# Patient Record
Sex: FEMALE | Race: BLACK OR AFRICAN AMERICAN
[De-identification: names, ages, dates, MRNs, and addresses within clinical notes are randomized per-mention and may not be internally consistent; named-entity substitution may affect disease eponyms.]

---

## 2019-04-15 ENCOUNTER — HOSPITAL ENCOUNTER (INPATIENT)
Dept: HOSPITAL 74 - JER | Age: 39
LOS: 2 days | Discharge: HOME | DRG: 194 | End: 2019-04-17
Attending: NURSE PRACTITIONER | Admitting: INTERNAL MEDICINE
Payer: COMMERCIAL

## 2019-04-15 VITALS — BODY MASS INDEX: 22.4 KG/M2

## 2019-04-15 DIAGNOSIS — E03.9: ICD-10-CM

## 2019-04-15 DIAGNOSIS — Z91.14: ICD-10-CM

## 2019-04-15 DIAGNOSIS — N18.9: ICD-10-CM

## 2019-04-15 DIAGNOSIS — I13.0: Primary | ICD-10-CM

## 2019-04-15 DIAGNOSIS — I50.41: ICD-10-CM

## 2019-04-15 DIAGNOSIS — E78.5: ICD-10-CM

## 2019-04-15 DIAGNOSIS — I31.3: ICD-10-CM

## 2019-04-15 DIAGNOSIS — I34.0: ICD-10-CM

## 2019-04-15 LAB
ALBUMIN SERPL-MCNC: 3.3 G/DL (ref 3.4–5)
ALP SERPL-CCNC: 51 U/L (ref 45–117)
ALT SERPL-CCNC: 27 U/L (ref 13–61)
ANION GAP SERPL CALC-SCNC: 7 MMOL/L (ref 8–16)
ARTERIAL BLOOD GAS PCO2: 26.8 MMHG (ref 35–45)
ARTERIAL PATENCY WRIST A: POSITIVE
AST SERPL-CCNC: 15 U/L (ref 15–37)
BASE EXCESS BLDA CALC-SCNC: -3.2 MEQ/L (ref -2–2)
BASOPHILS # BLD: 1 % (ref 0–2)
BILIRUB SERPL-MCNC: 0.3 MG/DL (ref 0.2–1)
BNP SERPL-MCNC: 1655.3 PG/ML (ref 5–125)
BUN SERPL-MCNC: 15 MG/DL (ref 7–18)
CALCIUM SERPL-MCNC: 8.3 MG/DL (ref 8.5–10.1)
CHLORIDE SERPL-SCNC: 111 MMOL/L (ref 98–107)
CO2 SERPL-SCNC: 23 MMOL/L (ref 21–32)
COHGB MFR BLD: 0.8 % (ref 0–2)
CREAT SERPL-MCNC: 1.2 MG/DL (ref 0.55–1.3)
DEPRECATED RDW RBC AUTO: 13.8 % (ref 11.6–15.6)
EOSINOPHIL # BLD: 2 % (ref 0–4.5)
GLUCOSE SERPL-MCNC: 92 MG/DL (ref 74–106)
HCT VFR BLD CALC: 36 % (ref 32.4–45.2)
HGB BLD-MCNC: 12.2 GM/DL (ref 10.7–15.3)
INR BLD: 1.05 (ref 0.83–1.09)
LYMPHOCYTES # BLD: 20.7 % (ref 8–40)
MCH RBC QN AUTO: 30.6 PG (ref 25.7–33.7)
MCHC RBC AUTO-ENTMCNC: 33.8 G/DL (ref 32–36)
MCV RBC: 90.6 FL (ref 80–96)
MONOCYTES # BLD AUTO: 3.4 % (ref 3.8–10.2)
NEUTROPHILS # BLD: 72.9 % (ref 42.8–82.8)
PLATELET # BLD AUTO: 246 K/MM3 (ref 134–434)
PMV BLD: 9.6 FL (ref 7.5–11.1)
PO2 BLDA: 129 MMHG (ref 80–105)
POTASSIUM SERPLBLD-SCNC: 4.2 MMOL/L (ref 3.5–5.1)
PROT SERPL-MCNC: 6.7 G/DL (ref 6.4–8.2)
PT PNL PPP: 12.4 SEC (ref 9.7–13)
RBC # BLD AUTO: 3.98 M/MM3 (ref 3.6–5.2)
SAO2 % BLDA: 98.8 % (ref 95–98)
SODIUM SERPL-SCNC: 141 MMOL/L (ref 136–145)
WBC # BLD AUTO: 9.5 K/MM3 (ref 4–10)

## 2019-04-15 RX ADMIN — HEPARIN SODIUM SCH UNIT: 5000 INJECTION, SOLUTION INTRAVENOUS; SUBCUTANEOUS at 23:53

## 2019-04-15 NOTE — PN
Teaching Attending Note


Name of Resident: Pamela Reeves





ATTENDING PHYSICIAN STATEMENT





I saw and evaluated the patient.


I reviewed the resident's note and discussed the case with the resident.


I agree with the resident's findings and plan as documented.








SUBJECTIVE: 38 yrs old F PMH of Hypothyroidism not taking levothyroxine since 

Dec 2018 due to insurance issue , works at a car [employer], no other past 

medical history, unlimited ET  few months ago, , no high risk behaviour last 

HIV testing was 1 yr ago, no H/O Joints pain, myelgias, recent URTI, fever, wt 

loss, chronic cough , today present with 2-3 wks h/o gradually worsening SOB, 

decrease ET, ABEBE, PND and orthopnea, initially she though that she is getting 

anxiety attacks  for past 2 days patient developed extreme SOB , couldn't walk 

few steps  dry cough and palpitation, came to Ed for evaluation as er Ed, rapid 

assessment showed collapsible IVC patient recived IV NS 1 Lt developed sever 

shorness of breath  D dimmers +, CXR diffuse  interstitial infiltrates, CTA no 

PE again shows Pulmonary congestion,  








OBJECTIVE:


 Vital Signs











Temperature  97.8 F   04/15/19 12:15


 


Pulse Rate  105 H  04/15/19 17:35


 


Respiratory Rate  20   04/15/19 17:35


 


Blood Pressure  147/115 H  04/15/19 17:35


 


O2 Sat by Pulse Oximetry (%)  97   04/15/19 17:55











young F on BIPAP c/o SOB  and chest tightness





HEENT: Mm moist, JVD+





NECK: + JVD no Bruit, carotids +





CHEST: B/L diffuse crepts





CVSL s1S2 Tachycardia





ABD: non tender BS





EXT: No edema feet, no calf tenderness





CNS: AOX3 





LABS:


CBC,CMP











WBC  9.5 K/mm3 (4.0-10.0)   04/15/19  13:00    


 


RBC  3.98 M/mm3 (3.60-5.2)   04/15/19  13:00    


 


Hgb  12.2 GM/dL (10.7-15.3)   04/15/19  13:00    


 


Hct  36.0 % (32.4-45.2)   04/15/19  13:00    


 


MCV  90.6 fl (80-96)   04/15/19  13:00    


 


MCH  30.6 pg (25.7-33.7)  D 04/15/19  13:00    


 


MCHC  33.8 g/dl (32.0-36.0)   04/15/19  13:00    


 


RDW  13.8 % (11.6-15.6)   04/15/19  13:00    


 


Plt Count  246 K/MM3 (134-434)  D 04/15/19  13:00    


 


MPV  9.6 fl (7.5-11.1)   04/15/19  13:00    


 


Absolute Neuts (auto)  6.9 K/mm3 (1.5-8.0)   04/15/19  13:00    


 


Neutrophils %  72.9 % (42.8-82.8)  D 04/15/19  13:00    


 


Lymphocytes %  20.7 % (8-40)  D 04/15/19  13:00    


 


Monocytes %  3.4 % (3.8-10.2)  L  04/15/19  13:00    


 


Eosinophils %  2.0 % (0-4.5)   04/15/19  13:00    


 


Basophils %  1.0 % (0-2.0)  D 04/15/19  13:00    


 


Nucleated RBC %  0 % (0-0)   04/15/19  13:00    


 


Sodium  141 mmol/L (136-145)   04/15/19  13:35    


 


Potassium  4.2 mmol/L (3.5-5.1)   04/15/19  13:35    


 


Chloride  111 mmol/L ()  H  04/15/19  13:35    


 


Carbon Dioxide  23 mmol/L (21-32)   04/15/19  13:35    


 


Anion Gap  7 MMOL/L (8-16)  L  04/15/19  13:35    


 


BUN  15 mg/dL (7-18)   04/15/19  13:35    


 


Creatinine  1.2 mg/dL (0.55-1.3)   04/15/19  13:35    


 


Creat Clearance w eGFR  50.83  (>60)   04/15/19  13:35    


 


Random Glucose  92 mg/dL ()   04/15/19  13:35    


 


Calcium  8.3 mg/dL (8.5-10.1)  L  04/15/19  13:35    


 


Total Bilirubin  0.3 mg/dL (0.2-1)   04/15/19  13:35    


 


AST  15 U/L (15-37)   04/15/19  13:35    


 


ALT  27 U/L (13-61)   04/15/19  13:35    


 


Alkaline Phosphatase  51 U/L ()   04/15/19  13:35    


 


Creatine Kinase  110 U/L ()   04/15/19  13:35    


 


Troponin I  < 0.02 ng/ml (0.00-0.05)   04/15/19  13:35    


 


B-Natriuretic Peptide  1655.3 pg/ml (5-125)  H  04/15/19  13:35    


 


Total Protein  6.7 g/dl (6.4-8.2)   04/15/19  13:35    


 


Albumin  3.3 g/dl (3.4-5.0)  L  04/15/19  13:35    


 


Serum Pregnancy, Qual  Negative   04/15/19  13:00    








CXR: B/l  interstial marking with nodular infiltrates





CTA: No central PE





D dimmers +





BNP 1655





EKG:  sinus tachycardia poor progression of R wave V1-V3








ASSESSMENT AND PLAN:38 yrs old F PMH of Hypothyroidism not taking levothyroxine 

since Dec 2018 due to insurance issue , works at a car [employer], no other 

past medical history, unlimited ET  few months ago,present with acute 

respiratory failure that decompensted  after IV Hydration, elevated D dimmers, 

CXR diffuse nodular infiltrates, CTA -ve for PE but again infiltrates, 

developed blood tinged sputum , elevated BNP, patient is on BIPAP respiratory 

status improved


Acute Dempensated CHF/respiratory failure;





At present unclear etiology,  considering  no fever, sick contact or recent 

travelling but gradual decompensation over few wks  possibility of either 

Pulmonary etiology infectious/non infectious  F/U Blood culture, ProCalcitonin, 

LDH , HIV testing if LDH is very high, sputum PCP,  consider ID consultation 

pending HIV result, for cardiac etiology ECHO, Cardiology consult, patient 

recived IV Lasix monitor on BIPAP  F/U ABG CBC, CMP, ESR, CRP, AEC level., 

Pulmonary consult.





Case discussed with the resident

## 2019-04-15 NOTE — PDOC
Attending Attestation





- Resident


Resident Name: Nelly Glover





- ED Attending Attestation


I have performed the following: I have examined & evaluated the patient, The 

case was reviewed & discussed with the resident, I agree w/resident's findings 

& plan, Exceptions are as noted





- HPI


HPI: 





04/15/19 15:09


38 F with h/o hyperthyroidism presenting to ED with SOB and chest pain x 2 

weeks. Pt reports ABEBE, as well as palpitations. Pt also complains of being 

woken from sleep with chest pressure. Pt denies any leg swelling. Denies h/o DVT

/PE. Denies OCP use. No recent travel/immobilization. Pt was seen by PMD 4/8 

and was instructed to come to ED, but pt waited until today because she lost 

her insurance.





- Physicial Exam


PE: 





04/15/19 15:24


"GENERAL: Awake, alert, and fully oriented, in no acute distress.


HEAD: No signs of trauma


EYES: PERRLA, EOMI, sclera anicteric, conjunctiva clear


ENT: Auricles normal inspection, hearing grossly normal, nares patent, 

oropharynx clear without exudates. Moist mucosa


NECK: Nontender, no stepoffs, Normal ROM, supple, no lymphadenopathy, JVD, or 

masses


LUNGS: Breath sounds equal, clear to auscultation bilaterally.  No wheezes, and 

no crackles


HEART: Regular rate and rhythm, normal S1 and S2, no murmurs, rubs or gallops


ABDOMEN: Soft, nontender, normoactive bowel sounds.  No guarding, no rebound.  

No masses


EXTREMITIES: Normal range of motion, no edema.  No clubbing or cyanosis. No 

cords, erythema, or tenderness


NEUROLOGICAL: Cranial nerves II through XII intact. 5/5 strength and sensation 

in all extremities, Normal speech, normal gait, normal cerebellar function


SKIN: Warm, Dry, normal turgor, no rashes or lesions noted.





- Critical Care Time


Total Critical Care Time: 60


Critical Care Statement: The care of this patient involved high complexity 

decision making to prevent further life threatening deterioration of the patient

's condition and/or to evaluate & treat vital organ system(s) failure or risk 

of failure.





- Medical Decision Making





04/15/19 15:24


38 F with SOB and chest pain. Vitals notable for tachycardia. WIll need to r/o 

PE. Also consider ACS/CHF.


- Labs, trop, BNP, ddimer


- CXR





04/15/19 15:25


CXR clear





Bedside US reveals small pericardial effusion and pleural effusions + B-lines. 

Abdominal US shows small amount of pelvic free fluid.


Bedside DVT studies negative bilaterally





Ddimer and BNP elevated


Will obtain CTA chest to r/o PE


Also CT abdomen/pelvis w/ IV contrast to evaluate for malignancy given ascites 

and pleural effusions seen on bedside sono





04/15/19 17:29


CTA negative for PE


CT shows likely pulmonary congestion





IV fluids DC'ed, lasix 40mg IV given





Pt reassessed - now with worsening SOB + rales on exam


Will place pt on BiPAP, give sublingual nitro





04/15/19 18:29


Discussed case with ICU team, who has evaluated pt


Signout given to Dr. South, who is at bedside to evaluate pt 01-Nov-2018 07:44:28

## 2019-04-15 NOTE — CONSULT
Consultation: 


REQUESTING PROVIDER: Dr. Martini





CONSULT REQUEST: We have been asked to medically evaluate this patient for SOB.





HISTORY OF PRESENT ILLNESS:


Patient is a 37 y/o female with a history of thyroid disease who presents for 

shortness of breath. Patient has been been feeling short of breath with 

pressure in her chest for the last two weeks. She reports the symptoms have 

been worsening. She is unable to tolerate walking over 20 feet without getting 

short of breath. she has been sleeping with more pillows at night. She reports 

having low apetite and diarrhea with eating. She decided to coming into the 

Emergency department tonight. They were ruling out PE and hydrated with fluids. 

Patient had continued difficulty breathing with worsening lung sounds so she 

was placed on bipap. She was given 80 of lasix and nitropaste. She reports her 

breathing is feeling a little better but she still has a pressure in her chest. 

She also feels like she has pain in the back of her chest which is causing 

difficulty breathing. Patients boyfriend has been ill with viral like symptoms, 

but she denies having any. Yesterday she had a cough with specks of blood, but 

denies fever, chills, rhinorrhea, or congestion. Patient has no other 

complaints. 








REVIEW OF SYSTEMS:


CONSTITUTIONAL: 


Absent:  fever, chills, diaphoresis, generalized weakness, malaise, loss of 

appetite, weight change


HEENT: 


Absent:  rhinorrhea, nasal congestion, throat pain, throat swelling, difficulty 

swallowing, mouth swelling, ear pain, eye pain, visual changes


CARDIOVASCULAR: pressure


Absent: chest pain, syncope, palpitations, irregular heart rate, lightheadedness

, peripheral edema


RESPIRATORY: shortness of breath


Absent: cough, dyspnea with exertion, orthopnea, wheezing, stridor, hemoptysis


GASTROINTESTINAL:


Absent: abdominal pain, abdominal distension, nausea, vomiting, diarrhea, 

constipation, melena, hematochezia


GENITOURINARY: 


Absent: dysuria, frequency, urgency, hesitancy, hematuria, flank pain, genital 

pain


MUSCULOSKELETAL: 


Absent: myalgia, arthralgia, joint swelling, back pain, neck pain


SKIN: 


Absent: rash, itching, pallor


HEMATOLOGIC/IMMUNOLOGIC: 


Absent: easy bleeding, easy bruising, lymphadenopathy, frequent infections


ENDOCRINE:


Absent: unexplained weight gain, unexplained weight loss, heat intolerance, 

cold intolerance


NEUROLOGIC: 


Absent: headache, focal weakness or paresthesias, dizziness, unsteady gait, 

seizure, mental status changes, bladder or bowel incontinence


PSYCHIATRIC: 


Absent: anxiety, depression, suicidal or homicidal ideation, hallucinations.





PHYSICAL EXAMINATION





  Vital Signs











Temperature  97.8 F   04/15/19 12:15


 


Pulse Rate  105 H  04/15/19 17:35


 


Respiratory Rate  20   04/15/19 17:35


 


Blood Pressure  147/115 H  04/15/19 17:35


 


O2 Sat by Pulse Oximetry (%)  100   04/15/19 14:30


























GENERAL: Awake, alert, and fully oriented, on bipap


HEAD: Normal with no signs of trauma.


EYES: Pupils equal, round and reactive to light, extraocular movements intact,


EARS, NOSE, THROAT: Moist mucous membranes.


NECK: no JVD


LUNGS: Breath sounds equal, crackles at bilateral bases, no accessory muscle use


HEART: Regular rate and rhythm, normal S1 and S2 without murmur, rub or gallop.


ABDOMEN: Soft, nontender, not distended, normoactive bowel sounds, 


MUSCULOSKELETAL: Normal range of motion at all joints. 


LOWER EXTREMITIES: 2+ pulses, warm, well-perfused. No calf tenderness. No 

peripheral edema. 


PSYCHIATRIC: Cooperative. Good eye contact. Appropriate mood and affect.


SKIN: Warm, dry, normal turgor, no rashes or lesions noted. 











  CBCD











WBC  9.5 K/mm3 (4.0-10.0)   04/15/19  13:00    


 


RBC  3.98 M/mm3 (3.60-5.2)   04/15/19  13:00    


 


Hgb  12.2 GM/dL (10.7-15.3)   04/15/19  13:00    


 


Hct  36.0 % (32.4-45.2)   04/15/19  13:00    


 


MCV  90.6 fl (80-96)   04/15/19  13:00    


 


MCHC  33.8 g/dl (32.0-36.0)   04/15/19  13:00    


 


RDW  13.8 % (11.6-15.6)   04/15/19  13:00    


 


Plt Count  246 K/MM3 (134-434)  D 04/15/19  13:00    


 


MPV  9.6 fl (7.5-11.1)   04/15/19  13:00    








 CMP











Sodium  141 mmol/L (136-145)   04/15/19  13:35    


 


Potassium  4.2 mmol/L (3.5-5.1)   04/15/19  13:35    


 


Chloride  111 mmol/L ()  H  04/15/19  13:35    


 


Carbon Dioxide  23 mmol/L (21-32)   04/15/19  13:35    


 


Anion Gap  7 MMOL/L (8-16)  L  04/15/19  13:35    


 


BUN  15 mg/dL (7-18)   04/15/19  13:35    


 


Creatinine  1.2 mg/dL (0.55-1.3)   04/15/19  13:35    


 


Creat Clearance w eGFR  50.83  (>60)   04/15/19  13:35    


 


Calcium  8.3 mg/dL (8.5-10.1)  L  04/15/19  13:35    


 


Total Bilirubin  0.3 mg/dL (0.2-1)   04/15/19  13:35    


 


AST  15 U/L (15-37)   04/15/19  13:35    


 


ALT  27 U/L (13-61)   04/15/19  13:35    


 


Alkaline Phosphatase  51 U/L ()   04/15/19  13:35    


 


Total Protein  6.7 g/dl (6.4-8.2)   04/15/19  13:35    


 


Albumin  3.3 g/dl (3.4-5.0)  L  04/15/19  13:35    

















ASSESSMENT/PLAN:





Patient is a 37 y/o female with a history of hypothyroidism who is admitted to 

the ICU for respiratory decompensation likely due to new CHF.








Neuro


   - A& O x3


   - monitor for any change in mental status


   - afebrile





Cardio


   - likely new onset CHF, BNP 1655


   - received 1 L NS


   - lasix 80 given with 200 ml output


   - nitropaste given for pressure


   - troponin negative


   - ekg without any st changes, twave abnormalities, or av blocks noted


   - f/u Dr. Garza consult


   - f/u Echo





Pulm


   - CXR: clear, CTA: interstitial vascular pulmonary congestion, mild 

cardiomegaly, small bilateral pleural effusions, left upper lobe and left lower 

lobe with opacity, no evidence of PE


   - ddimer 533, no PE on CTA


   - saturation 100% on Bipap, Ipap 10, Epap 5, O2% 40, titrate down as possible


   - f/u ABG


   - keep O 2 above 95%


   - f/u morning CXR





GI


   - stable





Renal


   - Cr: 1.2, monitor with lasix use





Endo


   - continue levothyroxine


   - f/u TSH, free T 3, free T4


   - unlikely myxedema coma without other evidence of hypothyroidism





FEN


   - NPO with Bipap


   - hold fluids, monitor I's and O's


   - no electrolyte abnormalitites





Dispo: We will continue to follow the patient. Thank you for this consultative 

opportunity.


Monitor overnight and titrate off BIPAP











Visit type





- Emergency Visit


Emergency Visit: Yes


ED Registration Date: 04/15/19


Care time: The patient presented to the Emergency Department on the above date 

and was hospitalized for further evaluation of their emergent condition.





- New Patient


This patient is new to me today: Yes


Date on this admission: 04/16/19





- Critical Care


Critical Care patient: Yes


Total Critical Care Time (in minutes): 40


Critical Care Statement: The care of this patient involved high complexity 

decision making to prevent further life threatening deterioration of the patient

's condition and/or to evaluate & treat vital organ system(s) failure or risk 

of failure.
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
## 2019-04-15 NOTE — HP
CHIEF COMPLAINT:





PCP: Dr Day





HISTORY OF PRESENT ILLNESS:


This is a 39 yo F, with PMH of hypothyroidism, who present due to worsening 

exertional SOB, palpitations and chest pressure x 2 w. patient was directed to 

ED by her PCP after a visit on 4/08 for DVT/PE/effusion work-up due to noted 

tacycardia and diminished breath sounds in R lung base. Patient has not taken 

her levothyroxine for several months due to loss of insurance and reports new 

bouts on anxiety and heat intolerance. Patient does not take any medication but 

did use nuvaring birth control until jan 2019. Denies family history of CHF, 

sudden death. denies recent viral illness, drug use, alcohol abuse, syncope, 

cough, orthopnea, le edema, f/c, abd pain,n/v/d/c, dysuria. 





CTA no PE but shows diffuse pulm edema suspicious for ARDS. Placed on BIPAP in 

ED 





ER course was notable for:


(1) cxr, cta, ct abd/pelvis 


(2) labs 


(3) 1L NS, 80 lasix, ntg 





Recent Travel: denies 





PAST MEDICAL HISTORY: as above 





PAST SURGICAL HISTORY: R breast cyst excision ~20 years ago





Social History:


Smoking: denies 


Alcohol:denies 


Drugs: denies 





Family History: father and grandfather with early CAD and prostate CA (in 40s); 

HTN history all through mother's family.





Allergies





iron Allergy (Verified 12/08/14 12:55)


 








HOME MEDICATIONS:


 Home Medications











 Medication  Instructions  Recorded


 


NK [No Known Home Medication]  12/08/14








REVIEW OF SYSTEMS


CONSTITUTIONAL: 


Absent:  fever, chills


HEENT: 


Absent:  rhinorrhea, nasal congestion, throat pain


CARDIOVASCULAR: 


Absent: syncope, irregular heart rate, lightheadedness, peripheral edema


RESPIRATORY: 


Absent: cough, orthopnea, wheezing, stridor, hemoptysis


GASTROINTESTINAL:


Absent: abdominal pain, abdominal distension, nausea, vomiting, diarrhea, 

constipation, melena, hematochezia


GENITOURINARY: 


Absent: dysuria


MUSCULOSKELETAL: 


Absent: myalgia, arthralgia


SKIN: 


Absent: rash, itching, pallor


HEMATOLOGIC/IMMUNOLOGIC: 


Absent: easy bleeding, easy bruising


ENDOCRINE:


Absent: unexplained weight gain, unexplained weight loss


NEUROLOGIC: 


Absent: headache, focal weakness or paresthesias


PSYCHIATRIC: 


Absent: anxiety, depression








PHYSICAL EXAMINATION


 Vital Signs - 24 hr











  04/15/19 04/15/19 04/15/19





  12:15 12:30 14:30


 


Temperature 97.8 F  


 


Pulse Rate 114 H  


 


Pulse Rate [   90





Apical]   


 


Respiratory 19  20





Rate   


 


Blood Pressure 143/108 H  


 


Blood Pressure   147/113 H





[Right Arm]   


 


O2 Sat by Pulse 99 99 100





Oximetry (%)   











GENERAL: Awake, alert, and fully oriented, in no acute distress.


HEAD: Normal with no signs of trauma.


EYES: Pupils equal, round and reactive to light, extraocular movements intact, 

sclera anicteric, conjunctiva clear. 


EARS, NOSE, THROAT: Moist mucous membranes.


NECK: supple without JVD


LUNGS: diffuse rales and crackles, copious clear blood tinges sputum 


HEART: tachy rate and regular rhythm, normal S1 and S2 without murmur


ABDOMEN: Soft, nontender, not distended, normoactive bowel sounds, no guarding, 

no rebound, no masses. 


MUSCULOSKELETAL:No CVA tenderness.


UPPER EXTREMITIES: 2+ pulses, warm, well-perfused. No cyanosis. No clubbing. No 

peripheral edema.


LOWER EXTREMITIES: 2+ pulses, warm, well-perfused. No calf tenderness. No 

peripheral edema. 


NEUROLOGICAL:  Cranial nerves II-XII grossly intact. Normal speech. 


PSYCHIATRIC: Cooperative. Good eye contact. Appropriate mood and affect.


SKIN: Warm, dry


 Laboratory Results - last 24 hr











  04/15/19 04/15/19 04/15/19





  13:00 13:00 13:00


 


WBC  9.5  


 


RBC  3.98  


 


Hgb  12.2  


 


Hct  36.0  


 


MCV  90.6  


 


MCH  30.6  D  


 


MCHC  33.8  


 


RDW  13.8  


 


Plt Count  246  D  


 


MPV  9.6  


 


Absolute Neuts (auto)  6.9  


 


Neutrophils %  72.9  D  


 


Lymphocytes %  20.7  D  


 


Monocytes %  3.4 L  


 


Eosinophils %  2.0  


 


Basophils %  1.0  D  


 


Nucleated RBC %  0  


 


PT with INR   12.40 


 


INR   1.05 


 


D-Dimer   


 


Sodium   


 


Potassium   


 


Chloride   


 


Carbon Dioxide   


 


Anion Gap   


 


BUN   


 


Creatinine   


 


Creat Clearance w eGFR   


 


Random Glucose   


 


Calcium   


 


Total Bilirubin   


 


AST   


 


ALT   


 


Alkaline Phosphatase   


 


Creatine Kinase   


 


Troponin I   


 


B-Natriuretic Peptide   


 


Total Protein   


 


Albumin   


 


Serum Pregnancy, Qual    Negative


 


Blood Type   


 


Antibody Screen   














  04/15/19 04/15/19 04/15/19





  13:00 13:00 13:35


 


WBC   


 


RBC   


 


Hgb   


 


Hct   


 


MCV   


 


MCH   


 


MCHC   


 


RDW   


 


Plt Count   


 


MPV   


 


Absolute Neuts (auto)   


 


Neutrophils %   


 


Lymphocytes %   


 


Monocytes %   


 


Eosinophils %   


 


Basophils %   


 


Nucleated RBC %   


 


PT with INR   


 


INR   


 


D-Dimer   533 H 


 


Sodium    141


 


Potassium    4.2


 


Chloride    111 H


 


Carbon Dioxide    23


 


Anion Gap    7 L


 


BUN    15


 


Creatinine    1.2


 


Creat Clearance w eGFR    50.83


 


Random Glucose    92


 


Calcium    8.3 L


 


Total Bilirubin    0.3


 


AST    15


 


ALT    27


 


Alkaline Phosphatase    51


 


Creatine Kinase    110


 


Troponin I    < 0.02


 


B-Natriuretic Peptide    1655.3 H


 


Total Protein    6.7


 


Albumin    3.3 L


 


Serum Pregnancy, Qual   


 


Blood Type  O POSITIVE  


 


Antibody Screen  Negative  











ASSESSMENT/PLAN:





This is a 39 yo F, with PMH of hypothyroidism, who present due to worsening 

exertional SOB, palpitations and chest pressure x 2 w.





Acute respiratory distress


Pulmonary edema


Fluid overload 


Hypothyroidism 


-cardiac vs primary pulmonary cause 


-differential includes hypothyroid induced chf, cardiomyopaty, valvular issues, 

sarcoid, pcp, pulm fibrosis


-s/p lasix 80, continue daily lasix 40. stric I and O daily weight


-TTE


-cardiac monitoring 


-f/u mag/phos, LDH, procalcitonin, TFT's, abg


-PIPAP


-cardio, pulm consults 


-Loenox ppx 


-ICU care 











Problem List





- Problem


(1) Pulmonary edema cardiac cause


Code(s): I50.1 - LEFT VENTRICULAR FAILURE, UNSPECIFIED   





(2) Heart failure


Code(s): I50.9 - HEART FAILURE, UNSPECIFIED   





(3) Hypothyroid


Code(s): E03.9 - HYPOTHYROIDISM, UNSPECIFIED   





Visit type





- Emergency Visit


Emergency Visit: Yes


ED Registration Date: 04/15/19


Care time: The patient presented to the Emergency Department on the above date 

and was hospitalized for further evaluation of their emergent condition.





- New Patient


This patient is new to me today: Yes


Date on this admission: 04/15/19





- Critical Care


Critical Care patient: Yes


Total Critical Care Time (in minutes): 40


Critical Care Statement: The care of this patient involved high complexity 

decision making to prevent further life threatening deterioration of the patient

's condition and/or to evaluate & treat vital organ system(s) failure or risk 

of failure.

## 2019-04-16 LAB
ALBUMIN SERPL-MCNC: 3.3 G/DL (ref 3.4–5)
ALP SERPL-CCNC: 53 U/L (ref 45–117)
ALT SERPL-CCNC: 23 U/L (ref 13–61)
AMPHET UR-MCNC: NEGATIVE NG/ML
ANION GAP SERPL CALC-SCNC: 8 MMOL/L (ref 8–16)
APPEARANCE UR: CLEAR
AST SERPL-CCNC: 15 U/L (ref 15–37)
BARBITURATES UR-MCNC: NEGATIVE NG/ML
BASOPHILS # BLD: 1 % (ref 0–2)
BENZODIAZ UR SCN-MCNC: NEGATIVE NG/ML
BILIRUB DIRECT SERPL-MCNC: 185 U/L (ref 84–246)
BILIRUB SERPL-MCNC: 0.7 MG/DL (ref 0.2–1)
BILIRUB UR STRIP.AUTO-MCNC: NEGATIVE MG/DL
BUN SERPL-MCNC: 13 MG/DL (ref 7–18)
CALCIUM SERPL-MCNC: 8.3 MG/DL (ref 8.5–10.1)
CHLORIDE SERPL-SCNC: 108 MMOL/L (ref 98–107)
CHOLEST SERPL-MCNC: 211 MG/DL (ref 50–200)
CO2 SERPL-SCNC: 23 MMOL/L (ref 21–32)
COCAINE UR-MCNC: NEGATIVE NG/ML
COLOR UR: YELLOW
CREAT SERPL-MCNC: 1 MG/DL (ref 0.55–1.3)
DEPRECATED RDW RBC AUTO: 13.8 % (ref 11.6–15.6)
EOSINOPHIL # BLD: 0.8 % (ref 0–4.5)
ERYTHROCYTE [SEDIMENTATION RATE] IN BLOOD BY WESTERGREN METHOD: 8 MM/HR (ref 0–20)
GLUCOSE SERPL-MCNC: 73 MG/DL (ref 74–106)
HCT VFR BLD CALC: 35 % (ref 32.4–45.2)
HDLC SERPL-MCNC: 72 MG/DL (ref 40–60)
HGB BLD-MCNC: 12 GM/DL (ref 10.7–15.3)
KETONES UR QL STRIP: (no result)
LEUKOCYTE ESTERASE UR QL STRIP.AUTO: NEGATIVE
LYMPHOCYTES # BLD: 12.9 % (ref 8–40)
MAGNESIUM SERPL-MCNC: 2 MG/DL (ref 1.8–2.4)
MCH RBC QN AUTO: 30.9 PG (ref 25.7–33.7)
MCHC RBC AUTO-ENTMCNC: 34.2 G/DL (ref 32–36)
MCV RBC: 90.3 FL (ref 80–96)
METHADONE UR-MCNC: NEGATIVE NG/ML
MONOCYTES # BLD AUTO: 1.9 % (ref 3.8–10.2)
NEUTROPHILS # BLD: 83.4 % (ref 42.8–82.8)
NITRITE UR QL STRIP: NEGATIVE
OPIATES UR QL SCN: NEGATIVE NG/ML
PCP UR QL SCN: NEGATIVE NG/ML
PH UR: 5 [PH] (ref 5–8)
PHOSPHATE SERPL-MCNC: 3.3 MG/DL (ref 2.5–4.9)
PLATELET # BLD AUTO: 220 K/MM3 (ref 134–434)
PMV BLD: 10.2 FL (ref 7.5–11.1)
POTASSIUM SERPLBLD-SCNC: 3.6 MMOL/L (ref 3.5–5.1)
PROT SERPL-MCNC: 6.7 G/DL (ref 6.4–8.2)
PROT UR QL STRIP: NEGATIVE
PROT UR QL STRIP: NEGATIVE
RBC # BLD AUTO: 3.87 M/MM3 (ref 3.6–5.2)
SODIUM SERPL-SCNC: 139 MMOL/L (ref 136–145)
SP GR UR: 1.02 (ref 1.01–1.03)
TRIGL SERPL-MCNC: 162 MG/DL (ref 0–150)
UROBILINOGEN UR STRIP-MCNC: 0.2 MG/DL (ref 0.2–1)
WBC # BLD AUTO: 11.3 K/MM3 (ref 4–10)

## 2019-04-16 RX ADMIN — HEPARIN SODIUM SCH UNIT: 5000 INJECTION, SOLUTION INTRAVENOUS; SUBCUTANEOUS at 12:59

## 2019-04-16 RX ADMIN — ACETAMINOPHEN PRN MG: 325 TABLET ORAL at 19:57

## 2019-04-16 RX ADMIN — ACETAMINOPHEN PRN MG: 325 TABLET ORAL at 10:09

## 2019-04-16 RX ADMIN — CARVEDILOL SCH MG: 6.25 TABLET, FILM COATED ORAL at 21:10

## 2019-04-16 RX ADMIN — ACETAMINOPHEN PRN MG: 325 TABLET ORAL at 05:32

## 2019-04-16 RX ADMIN — MUPIROCIN SCH APPLIC: 20 OINTMENT TOPICAL at 10:10

## 2019-04-16 RX ADMIN — HEPARIN SODIUM SCH UNIT: 5000 INJECTION, SOLUTION INTRAVENOUS; SUBCUTANEOUS at 21:11

## 2019-04-16 RX ADMIN — MUPIROCIN SCH APPLIC: 20 OINTMENT TOPICAL at 00:36

## 2019-04-16 RX ADMIN — ACETAMINOPHEN PRN MG: 325 TABLET ORAL at 00:45

## 2019-04-16 RX ADMIN — HEPARIN SODIUM SCH UNIT: 5000 INJECTION, SOLUTION INTRAVENOUS; SUBCUTANEOUS at 05:32

## 2019-04-16 NOTE — EKG
Test Reason : 

Blood Pressure : ***/*** mmHG

Vent. Rate : 096 BPM     Atrial Rate : 096 BPM

   P-R Int : 162 ms          QRS Dur : 082 ms

    QT Int : 368 ms       P-R-T Axes : 077 -19 053 degrees

   QTc Int : 464 ms

 

NORMAL SINUS RHYTHM

POSSIBLE LEFT ATRIAL ENLARGEMENT

POSSIBLE ANTERIOR INFARCT , AGE UNDETERMINED

ABNORMAL ECG

NO PREVIOUS ECGS AVAILABLE

Confirmed by MD Ton, Checo ([id number]) on 4/16/2019 11:00:12 AM

 

Referred By:             Confirmed By:Checo Camilo MD

## 2019-04-16 NOTE — PN
Physical Exam: 


SUBJECTIVE: Patient seen this morning and reports her symptoms are feeling 

better. She was taken off Bipap overnight and had no acute events. 








OBJECTIVE:





  Vital Signs











Temperature  98.1 F   04/16/19 08:00


 


Pulse Rate  93 H  04/16/19 10:00


 


Respiratory Rate  20   04/16/19 10:00


 


Blood Pressure  125/90   04/16/19 10:00


 


O2 Sat by Pulse Oximetry (%)  100   04/16/19 09:24




















GENERAL: Awake, alert, and fully oriented


EYES: Pupils equal, round and reactive to light, extraocular movements intact,


EARS, NOSE, THROAT: Moist mucous membranes.


NECK: no JVD


LUNGS: Breath sounds equal, no rales, rhonci or crackles


HEART: Regular rate and rhythm, normal S1 and S2 without murmur, rub or gallop.


ABDOMEN: Soft, nontender, not distended, normoactive bowel sounds, 


MUSCULOSKELETAL: Normal range of motion at all joints. 


LOWER EXTREMITIES: 2+ pulses, warm, well-perfused. No calf tenderness. No 

peripheral edema. 


PSYCHIATRIC: Cooperative. Good eye contact. Appropriate mood and affect.


SKIN: Warm, dry, normal turgor, no rashes or lesions noted. 

















  CBCD











WBC  11.3 K/mm3 (4.0-10.0)  H  04/16/19  05:30    


 


RBC  3.87 M/mm3 (3.60-5.2)   04/16/19  05:30    


 


Hgb  12.0 GM/dL (10.7-15.3)   04/16/19  05:30    


 


Hct  35.0 % (32.4-45.2)   04/16/19  05:30    


 


MCV  90.3 fl (80-96)   04/16/19  05:30    


 


MCHC  34.2 g/dl (32.0-36.0)   04/16/19  05:30    


 


RDW  13.8 % (11.6-15.6)   04/16/19  05:30    


 


Plt Count  220 K/MM3 (134-434)   04/16/19  05:30    


 


MPV  10.2 fl (7.5-11.1)   04/16/19  05:30    








 CMP











Sodium  139 mmol/L (136-145)   04/16/19  05:30    


 


Potassium  3.6 mmol/L (3.5-5.1)   04/16/19  05:30    


 


Chloride  108 mmol/L ()  H  04/16/19  05:30    


 


Carbon Dioxide  23 mmol/L (21-32)   04/16/19  05:30    


 


Anion Gap  8 MMOL/L (8-16)   04/16/19  05:30    


 


BUN  13 mg/dL (7-18)   04/16/19  05:30    


 


Creatinine  1.0 mg/dL (0.55-1.3)   04/16/19  05:30    


 


Creat Clearance w eGFR  62.74  (>60)   04/16/19  05:30    


 


Calcium  8.3 mg/dL (8.5-10.1)  L  04/16/19  05:30    


 


Total Bilirubin  0.7 mg/dL (0.2-1)   04/16/19  05:30    


 


AST  15 U/L (15-37)   04/16/19  05:30    


 


ALT  23 U/L (13-61)   04/16/19  05:30    


 


Alkaline Phosphatase  53 U/L ()   04/16/19  05:30    


 


Total Protein  6.7 g/dl (6.4-8.2)   04/16/19  05:30    


 


Albumin  3.3 g/dl (3.4-5.0)  L  04/16/19  05:30    


























Active Medications





Acetaminophen (Tylenol -)  650 mg PO Q4H PRN


   PRN Reason: HEADACHE


   Last Admin: 04/16/19 10:09 Dose:  650 mg


Aspirin (Ecotrin -)  81 mg PO DAILY [facility]


   Last Admin: 04/16/19 10:11 Dose:  81 mg


Carvedilol (Coreg -)  6.25 mg PO BID [facility]


   Last Admin: 04/16/19 10:11 Dose:  6.25 mg


Chlorhexidine Gluconate (Hibiclens For Decolonization -)  1 applic TP HS [facility]


   Last Admin: 04/16/19 00:37 Dose:  1 applic


Furosemide (Lasix Injection -)  40 mg IVPUSH DAILY [facility]


Heparin Sodium (Porcine) (Heparin -)  5,000 unit SQ TID [facility]


   Last Admin: 04/16/19 05:32 Dose:  5,000 unit


Levothyroxine Sodium (Synthroid -)  75 mcg PO AM [facility]


Mupirocin (Bactroban Ointment (For Decolonization) -)  1 applic NS BID [facility]


   Stop: 04/20/19 23:44


   Last Admin: 04/16/19 10:10 Dose:  1 applic


Valsartan (Diovan -)  40 mg PO DAILY [facility]


   Last Admin: 04/16/19 10:09 Dose:  40 mg











ASSESSMENT/PLAN:





Patient is a 39 y/o female with a history of hypothyroidism who is admitted to 

the ICU for respiratory decompensation likely due to new CHF.








Neuro


   - A& O x3


   - monitor for any change in mental status


   - afebrile





Cardio


   - likely new onset CHF, BNP 1655


   - lasix 40 daily


   - valsartan 40 daily


   - carvedilol 6.25 mg BID


   - ekg with q wave changes


   - f/u Dr. Garza consult


   - f/u Echo official read, prelim read mild LV dysfunction, moderate MR, EF 50

-55%





Pulm


   - CXR: clear, CTA: interstitial vascular pulmonary congestion, mild 

cardiomegaly, small bilateral pleural effusions, left upper lobe and left lower 

lobe with opacity, no evidence of PE


   - saturating well on room air


   - keep O 2 above 95%


   - morning CXR: improvement from yesterday





GI


   - stable





Renal


   - Cr: 1.0, monitor with lasix use





Endo


   - TSH 26, restarted home levothyroxine


   - f/u endo consult


   - unlikely myxedema coma without other evidence of hypothyroidism





FEN


   - low sodium diet


   - no electrolyte abnormalitites





Dispo: patient can go to tele, stable 





Visit type





- Emergency Visit


Emergency Visit: No





- New Patient


This patient is new to me today: No





- Critical Care


Critical Care patient: Yes


Total Critical Care Time (in minutes): 40


Critical Care Statement: The care of this patient involved high complexity 

decision making to prevent further life threatening deterioration of the patient

's condition and/or to evaluate & treat vital organ system(s) failure or risk 

of failure.

## 2019-04-16 NOTE — CONS
DATE OF CONSULTATION:  04/16/2019

 

REQUESTING PHYSICIAN:  Hospitalist service.

 

CHIEF COMPLAINT:  Progressive dyspnea, subsequent acute respiratory distress,

cardiovascular evaluation.

 

This is a 38-year-old  female with no significant past medical

history other than hypothyroidism, currently on no therapy, who denied any history of

hypertensive cardiovascular disease, diabetes mellitus, hypercholesterolemia, tobacco

abuse, but reported family history of premature coronary artery disease and systolic

left ventricular dysfunction with congestive heart failure.  She presented to [facility] with 2 weeks of progressive dyspnea.  Dyspnea has been

noted at rest and with physical activity.  Patient reported orthopnea, but denied any

paroxysmal nocturnal dyspnea.  Patient denied any peripheral edema.  Patient denied

any chest discomfort.  Patient reported progressive fatigue and tiredness.  Patient

denied any palpitations, dizziness, lightheadedness, or syncope.  Upon evaluation in

the emergency room, patient was initiated on IV fluid and subsequently CTA of the

chest was performed to rule out pulmonary embolism and she was noted to have evidence

of interstitial pulmonary edema.  Patient subsequently developed respiratory

distress; in view of which, she was admitted to the intensive care unit.  Patient

currently is awake and alert, complaining of generalized weakness.  Sinus rhythm is

noted with elevated diastolic blood pressure.

 

PAST MEDICAL HISTORY:  Hypothyroidism.

 

SOCIAL HISTORY:  Nonsmoker.

 

FAMILY HISTORY:  Positive for coronary artery disease and, in addition, congestive

heart failure.

 

ALLERGIES:  IRON.

 

MEDICAL THERAPY:  Currently includes Tylenol 650 mg every 4 hours as needed, Lasix 40

mg IV push once, subcutaneous heparin 5000 units 3 times a day.

 

REVIEW OF SYSTEMS:

Head and Neck:  Denies headache, photophobia, blurring of vision.

Respiratory:  No cough or sputum production.

Cardiovascular:  As noted above.

Gastrointestinal:  Denies nausea, vomiting, diarrhea, abdominal discomfort.

Genitourinary:  No symptoms reported.

Musculoskeletal:  No symptoms reported.

 

PHYSICAL EXAMINATION:

Vital Signs:   Blood pressure 125/94 mmHg.  Pulse rate is 83 beats per minute. 

Saturation 99%.

Head and Neck:  Pupils equal and reactive to light and accommodation.  Extraocular

muscles are intact.  Anicteric sclerae.  Negative JVD.  No bruit appreciated.

Chest:  Diminished breath sounds at the bases bilaterally.

Cardiovascular:  S1, S2 regular.  Grade 1/6 systolic apical murmur.

Abdomen:  Soft, benign.  Normoactive bowel sounds.

Extremities:  Negative edema.  Intact distal pulses.  No calf tenderness.

.

Electrocardiogram revealed sinus rhythm, poor R-wave progression, nonspecific T-wave

abnormality.

 

Chest x-ray and CT scan of the chest reports were noted.

 

ABG revealed a pH of 7.46, pCO2 of 26.8, pO2 of 129, saturation 98.8.  Troponin less

than 0.02.  BNP 1655.  CBC revealed white cell count 11.3, hemoglobin 12.0, platelets

of 220.  Basic metabolic profile from this morning is pending.  Yesterday's basic

metabolic profile revealed sodium 141, potassium 4.2, BUN 15, creatinine 1.2, glucose

92.  TSH 29.00, T4 of 0.42.

 

ASSESSMENT:

1.  Clinical presentation consistent with acute class 2-3 [employer]

classification left ventricular failure related to systolic versus diastolic left

ventricular dysfunction.  Left ventricular function to be assessed, most likely

hypothyroidism related.

2.  Hypertension, currently on no medical therapy.

3.  Probable hypercholesterolemia.

4.  Systolic apical murmur, most likely related to mitral valve disease, mitral valve

regurgitation.

5.  Hypothyroidism, untreated.

6.  Chronic kidney disease.

 

RECOMMENDATION:

1.  Addition of beta-blocker, carvedilol, hemodynamics permitting.

2.  Addition of ACE inhibitors or angiotensin receptor blockers, possible Entresto

pending echocardiography to assess LV function, hemodynamics permitting with close

monitoring of renal function.

3.  Addition of aspirin.

4.  Continuation of diuretics.

5.  Therapy initiation for management of the above-noted hypothyroidism.

6.  Echocardiography as recommended and planned.

 

Above was reviewed in detail with the patient.

 

Thank you for the kind referral.

 

 

ADORE PRECIADO M.D.

 

[id number]

DD: 04/16/2019 07:17

DT: 04/16/2019 07:49

Job #:  [id number]

## 2019-04-16 NOTE — ECHO
______________________________________________________________________________



Name: ENRIQUEZ SHAWNA                               Exam:Adult Echocardiogram

MRN: [id number]         Study Date: 2019 07:37 AM

Age: 38 yrs

______________________________________________________________________________



Reason For Study: CHF

Height: 66 in        Weight: 138 lb        BSA: 1.7 m2



______________________________________________________________________________



MMode/2D Measurements & Calculations

IVSd: 0.76 cm                                                      Ao root diam: 2.4 cm

LVIDd: 5.6 cm                                                      LA dimension: 2.8 cm

LVIDs: 3.7 cm

LVPWd: 0.93 cm



____________________________________________________________________

EDV(Teich): 154.5 ml                                               LVOT diam: 2.0 cm

ESV(Teich): 59.8 ml



____________________________________________________________________

LAV(MOD-sp2): 91.9 ml

LAV(MOD-sp4): 70.1 ml

LAV(MOD-bp): 83.7 ml

LAV(MOD-bp) Indexed: 49.0 ml/m2

LAV (MOD-bp): 121.0 ml





Doppler Measurements & Calculations

MV E max peterson: 165.0 cm/sec                            Ao V2 max: 92.3 cm/sec

MV A max peterson: 58.6 cm/sec                             Ao max PG: 3.4 mmHg

MV E/A: 2.8                                           AI P1/2t: 720.9 msec

MV dec time: 0.10 sec                                 JAYME(V,D): 1.9 cm2

_______________________________________________________



AI max peterson: 313.5 cm/sec                              LV V1 max P.2 mmHg

AI max P.2 mmHg                                  LV V1 max: 55.5 cm/sec

AI dec slope: 127.5 cm/sec2



_______________________________________________________

MR max peterson: 543.8 cm/sec                              TR max peterson: 191.0 cm/sec

MR max P.6 mmHg                                 TR max P.6 mmHg



_______________________________________________________

PA V2 max: 71.9 cm/sec                                Med Peak E' Peterson: 11.0 cm/sec

PA max P.1 mmHg                                   Med E/e': 15.0

                                                      Lat Peak E' Peterson: 9.5 cm/sec

                                                      Lat E/e': 17.4

_______________________________________________________



PI Vmax: 167.0 cm/sec





______________________________________________________________________________

Left Ventricle

The left ventricle is mildly dilated. The posterior wall is severely hypokinetic. The remaining wall 
are

hypokinetic. Moderately reduced LV systolic function with LVEF=35%. Diastolic dysfunction, Grade III

(restrictive pattern), consistent with markedly increased left atrial pressure.

Right Ventricle

The right ventricle is normal in size and function.

Atria

The left atrium is mildly dilated. Right atrial size is normal.

Mitral Valve

Tethered mitral valve leaflets with moderate mitral valve regurgitation.

Tricuspid Valve

The tricuspid valve is normal in structure and function. There was insufficient TR detected to calcul
ate RV

systolic pressure.

Aortic Valve

The aortic valve is normal in structure and function. Mild aortic regurgitation.

Pulmonic Valve

The pulmonic valve is normal in structure and function.

Great Vessels

The aortic root is normal size.

Pericardium/Pleura

Small pericardial effusion (<1cm). There are no echocardiographic indications of cardiac tamponade. T
here is a

pleural effusion present.

______________________________________________________________________________





Interpretation Summary

The posterior wall is severely hypokinetic. The remaining wall are hypokinetic. Moderately reduced LV
 systolic

function with LVEF=35%.

The left ventricle is mildly dilated.

Diastolic dysfunction, Grade III (restrictive pattern), consistent with markedly increased left atria
l

pressure.

Tethered mitral valve leaflets with moderate mitral valve regurgitation.

Small pericardial effusion (<1cm)

There is a pleural effusion present.





Edmund Howell 2019 10:41 AM

## 2019-04-16 NOTE — PN
Teaching Attending Note


Name of Resident: Saadia Pearson





ATTENDING PHYSICIAN STATEMENT





I saw and evaluated the patient.


I reviewed the resident's note and discussed the case with the resident.


I agree with the resident's findings and plan as documented.








SUBJECTIVE:





Pt seen and examined in the ICU. States breathing much improved after diuresis. 

No chest pain. No fevers or chills.





OBJECTIVE:





 Vital Signs











 Period  Temp  Pulse  Resp  BP Sys/Morales  Pulse Ox


 


 Last 24 Hr  97.8 F-98.3 F    12-26  125-151/  








 Intake & Output











 04/13/19 04/14/19 04/15/19 04/16/19





 23:59 23:59 23:59 23:59


 


Intake Total   150 200


 


Output Total   100 1350


 


Balance   50 -1150


 


Weight   62.913 kg 61.825 kg








Gen:  NAD at rest


Heart: RRR, +murmur


Lung: decreased breath sounds at the bases


Abd: soft, nontender


Ext: no edema





 CBC, BMP





 04/16/19 05:30 





 04/16/19 05:30 





Active Medications





Acetaminophen (Tylenol -)  650 mg PO Q4H PRN


   PRN Reason: HEADACHE


   Last Admin: 04/16/19 10:09 Dose:  650 mg


Aspirin (Ecotrin -)  81 mg PO DAILY [facility]


   Last Admin: 04/16/19 10:11 Dose:  81 mg


Carvedilol (Coreg -)  6.25 mg PO BID [facility]


   Last Admin: 04/16/19 10:11 Dose:  6.25 mg


Chlorhexidine Gluconate (Hibiclens For Decolonization -)  1 applic TP HS [facility]


   Last Admin: 04/16/19 00:37 Dose:  1 applic


Furosemide (Lasix Injection -)  40 mg IVPUSH DAILY [facility]


Heparin Sodium (Porcine) (Heparin -)  5,000 unit SQ TID [facility]


   Last Admin: 04/16/19 05:32 Dose:  5,000 unit


Levothyroxine Sodium (Synthroid -)  75 mcg PO AM [facility]


Mupirocin (Bactroban Ointment (For Decolonization) -)  1 applic NS BID [facility]


   Stop: 04/20/19 23:44


   Last Admin: 04/16/19 10:10 Dose:  1 applic


Valsartan (Diovan -)  40 mg PO DAILY [facility]


   Last Admin: 04/16/19 10:09 Dose:  40 mg








ASSESSMENT AND PLAN:


Acute Congestive Heart Failure


HTN


Hypothyroidism


Hypercholesterolemia





-  continue lasix


-  monitor urine output, creatinine


-  daily weights


-  beta blocker, ACE-I


-  resume synthroid


-  O2 to keep SpO2 >90%


-  DVT prophylaxis


-  can monitor on telemetry

## 2019-04-16 NOTE — PN
Progress Note (short form)





- Note


Progress Note: 


Chief Complaint: Events noted, notes reviewed, dyspnea progressive for 2weeks, 

orthopnea denied PND, generalized weakness , denied any chest pain 





History of Present Illness: 


Seen and examined in the ICU.  Full consult dictated





- Current Medication List





 Current Medications





Acetaminophen (Tylenol -)  650 mg PO Q4H PRN


   PRN Reason: HEADACHE


   Last Admin: 04/16/19 05:32 Dose:  650 mg


Chlorhexidine Gluconate (Hibiclens For Decolonization -)  1 applic TP HS [facility]


   Last Admin: 04/16/19 00:37 Dose:  1 applic


Furosemide (Lasix Injection -)  40 mg IVPUSH ONCE ONE


   Stop: 04/16/19 10:01


Heparin Sodium (Porcine) (Heparin -)  5,000 unit SQ TID [facility]


   Last Admin: 04/16/19 05:32 Dose:  5,000 unit


Mupirocin (Bactroban Ointment (For Decolonization) -)  1 applic NS BID [facility]


   Stop: 04/20/19 23:44


   Last Admin: 04/16/19 00:36 Dose:  1 applic





 Home Medications











 Medication  Instructions  Recorded


 


NK [No Known Home Medication]  12/08/14














Review of Systems





Cardiovascular: As noted above


Respiratory: denies: Cough or Sputum Production


Gastrointestinal: denies: Nausea, Vomiting, Diarrhea, Constipation or Abdominal 

Discomfort   


Musculoskeletal: No Symptoms Reported


Endocrine: No Symptoms Reported





- Objective


Vital Signs: 





 Last Vital Signs











Temp Pulse Resp BP Pulse Ox


 


 98.2 F   83   24 H  125/94   99 


 


 04/16/19 06:00  04/16/19 06:00  04/16/19 06:00  04/16/19 06:00  04/16/19 06:00








 Intake & Output











 04/13/19 04/14/19 04/15/19 04/16/19





 23:59 23:59 23:59 23:59


 


Intake Total   150 200


 


Output Total   100 900


 


Balance   50 -700


 


Weight   138 lb 11.2 oz 136 lb 4.8 oz











Neck: Supple Negative JVD No Bruit


Cardiovascular: S1 S2 Regular Rate and Rhythm Grade 1-2/6 SM/apical  


Respiratory: Diminished Breath Sounds at the Bases Bilaterally


Gastrointestinal: Soft Benign Normal Bowel Sounds


Ext: Negative Edema





Labs: 





 ABG Results











ABG pH  7.46  (7.35-7.45)  H  04/15/19  18:54    


 


ABG pCO2 at Pt Temp  26.8 mmHg (35-45)  L  04/15/19  18:54    


 


ABG pO2 at Pt Temp  129 mmHg ()  H  04/15/19  18:54    


 


ABG HCO3  19.0 mmol/L (22-27)  L  04/15/19  18:54    


 


ABG O2 Sat (Measured)  98.8 % (95-98)  H  04/15/19  18:54    


 


ABG O2 Content  17.6 % vol (15-22)   04/15/19  18:54    


 


ABG Base Excess  -3.2 meq/l (-2-2)  L  04/15/19  18:54    








 Troponin, BNP











  04/15/19





  13:35


 


Troponin I  < 0.02


 


B-Natriuretic Peptide  1655.3 H








 CBC, BMP





 04/16/19 05:30 





 Hepatic Panel











Total Bilirubin  0.3 mg/dL (0.2-1)   04/15/19  13:35    


 


AST  15 U/L (15-37)   04/15/19  13:35    


 


ALT  27 U/L (13-61)   04/15/19  13:35    


 


Alkaline Phosphatase  51 U/L ()   04/15/19  13:35    


 


Albumin  3.3 g/dl (3.4-5.0)  L  04/15/19  13:35    








 INR, PTT











INR  1.05  (0.83-1.09)   04/15/19  13:00    











Assessment/Plan





ASSESSMENT:





1. Clinical presentation is consistent with acute class II-III NYHA 

classification LV failure related to systolic/diastolic LV dysfunction, LV 

function to be assessed/hypothyroidism related


2. Hypertension currently on no medical therapy


3. Hypercholesterolemia


4. Systolic apical murmur probable MR


5. Hypothyroidism, untreated


6. Chronic kidney disease





PLAN:





1. Add B-Blockers/Coreg, hemodynamics permitting


2. Add ACEI or ARBS/possible Entresto pending echocardiography to assess LV 

function, hemodynamics permitting with close monitoring of renal function


3. ADD ASA


4. Continue Diuretics


5. Therapy initiation for management of the above noted hypothyroidism


6. Echocardiography for evaluation of LV function and valvular function   














Beth Scales M.D.

## 2019-04-16 NOTE — PN
Physical Exam: 


SUBJECTIVE: Patient seen and examined in the ICU





OBJECTIVE:


bp 138/98 on cardiac monitor


stable oxygen sats


 Vital Signs











 Period  Temp  Pulse  Resp  BP Sys/Morales  Pulse Ox


 


 Last 24 Hr  98.1 F-98.3 F    12-26  125-151/  








GENERAL: The patient is awake, alert, and fully oriented, in no acute distress.


HEAD: Normal with no signs of trauma.


EYES: PERRL, extraocular movements intact, sclera anicteric, conjunctiva clear. 

No ptosis. 


ENT: Ears normal, nares patent, oropharynx clear without exudates, moist mucous 

membranes.


NECK: Trachea midline, full range of motion, supple. 


LUNGS: Breath sounds equal, clear to auscultation bilaterally, no wheezes


HEART: Regular rate and rhythm, S1, S2 without murmur, rub or gallop.


ABDOMEN: Soft, nontender, nondistended, normoactive bowel sounds, no guarding, 

no 


rebound, no hepatosplenomegaly, no masses.


EXTREMITIES: 2+ pulses, warm, well-perfused, no edema. 


NEUROLOGICAL: Cranial nerves II through XII grossly intact. Normal speech, gait 

not observed.


PSYCH: Normal mood, normal affect.


SKIN: Warm, dry, normal turgor, no rashes or lesions noted





 Laboratory Results - last 24 hr











  04/15/19 04/15/19 04/15/19





  13:00 13:00 13:00


 


WBC   


 


RBC   


 


Hgb   


 


Hct   


 


MCV   


 


MCH   


 


MCHC   


 


RDW   


 


Plt Count   


 


MPV   


 


Absolute Neuts (auto)   


 


Neutrophils %   


 


Lymphocytes %   


 


Monocytes %   


 


Eosinophils %   


 


Basophils %   


 


Nucleated RBC %   


 


ESR   


 


PT with INR  12.40  


 


INR  1.05  


 


D-Dimer   


 


Anticoagulation Therapy   


 


Puncture Site   


 


ABG pH   


 


ABG pCO2 at Pt Temp   


 


ABG pO2 at Pt Temp   


 


ABG HCO3   


 


ABG O2 Sat (Measured)   


 


ABG O2 Content   


 


ABG Base Excess   


 


Bobby Test   


 


Carboxyhemoglobin   


 


Methemoglobin   


 


O2 Delivery Device   


 


Oxygen Flow Rate   


 


Vent Mode   


 


Vent Rate   


 


Mechanical Rate   


 


Pressure Support Vent   


 


Sodium   


 


Potassium   


 


Chloride   


 


Carbon Dioxide   


 


Anion Gap   


 


BUN   


 


Creatinine   


 


Creat Clearance w eGFR   


 


Random Glucose   


 


Calcium   


 


Phosphorus   


 


Magnesium   


 


Total Bilirubin   


 


AST   


 


ALT   


 


Alkaline Phosphatase   


 


LD Total   


 


Creatine Kinase   


 


Troponin I   


 


C-Reactive Protein   


 


B-Natriuretic Peptide   


 


Total Protein   


 


Albumin   


 


Triglycerides   


 


Cholesterol   


 


Total LDL Cholesterol   


 


HDL Cholesterol   


 


TSH   


 


Free T4   


 


Serum Pregnancy, Qual   Negative 


 


Urine Color   


 


Urine Appearance   


 


Urine pH   


 


Ur Specific Gravity   


 


Urine Protein   


 


Urine Glucose (UA)   


 


Urine Ketones   


 


Urine Blood   


 


Urine Nitrite   


 


Urine Bilirubin   


 


Urine Urobilinogen   


 


Ur Leukocyte Esterase   


 


Urine Creatinine   


 


Opiates Screen   


 


Methadone Screen   


 


Barbiturate Screen   


 


Phencyclidine Screen   


 


Ur Amphetamines Screen   


 


MDMA (Ecstasy) Screen   


 


Benzodiazepines Screen   


 


Cocaine Screen   


 


U Marijuana (THC) Screen   


 


Blood Type    O POSITIVE


 


Antibody Screen    Negative














  04/15/19 04/15/19 04/15/19





  13:00 13:35 18:54


 


WBC   


 


RBC   


 


Hgb   


 


Hct   


 


MCV   


 


MCH   


 


MCHC   


 


RDW   


 


Plt Count   


 


MPV   


 


Absolute Neuts (auto)   


 


Neutrophils %   


 


Lymphocytes %   


 


Monocytes %   


 


Eosinophils %   


 


Basophils %   


 


Nucleated RBC %   


 


ESR   


 


PT with INR   


 


INR   


 


D-Dimer  533 H  


 


Anticoagulation Therapy    No Result Required.


 


Puncture Site    Right radial


 


ABG pH    7.46 H


 


ABG pCO2 at Pt Temp    26.8 L


 


ABG pO2 at Pt Temp    129 H


 


ABG HCO3    19.0 L


 


ABG O2 Sat (Measured)    98.8 H


 


ABG O2 Content    17.6


 


ABG Base Excess    -3.2 L


 


Bobby Test    Positive


 


Carboxyhemoglobin    0.8


 


Methemoglobin    0.2


 


O2 Delivery Device    40%


 


Oxygen Flow Rate    Yes


 


Vent Mode    No Result Required.


 


Vent Rate    12


 


Mechanical Rate    No Result Required.


 


Pressure Support Vent    10/5


 


Sodium   141 


 


Potassium   4.2 


 


Chloride   111 H 


 


Carbon Dioxide   23 


 


Anion Gap   7 L 


 


BUN   15 


 


Creatinine   1.2 


 


Creat Clearance w eGFR   50.83 


 


Random Glucose   92 


 


Calcium   8.3 L 


 


Phosphorus   


 


Magnesium   


 


Total Bilirubin   0.3 


 


AST   15 


 


ALT   27 


 


Alkaline Phosphatase   51 


 


LD Total   


 


Creatine Kinase   110 


 


Troponin I   < 0.02 


 


C-Reactive Protein   


 


B-Natriuretic Peptide   1655.3 H 


 


Total Protein   6.7 


 


Albumin   3.3 L 


 


Triglycerides   


 


Cholesterol   


 


Total LDL Cholesterol   


 


HDL Cholesterol   


 


TSH   29.00 H 


 


Free T4   


 


Serum Pregnancy, Qual   


 


Urine Color   


 


Urine Appearance   


 


Urine pH   


 


Ur Specific Gravity   


 


Urine Protein   


 


Urine Glucose (UA)   


 


Urine Ketones   


 


Urine Blood   


 


Urine Nitrite   


 


Urine Bilirubin   


 


Urine Urobilinogen   


 


Ur Leukocyte Esterase   


 


Urine Creatinine   


 


Opiates Screen   


 


Methadone Screen   


 


Barbiturate Screen   


 


Phencyclidine Screen   


 


Ur Amphetamines Screen   


 


MDMA (Ecstasy) Screen   


 


Benzodiazepines Screen   


 


Cocaine Screen   


 


U Marijuana (THC) Screen   


 


Blood Type   


 


Antibody Screen   














  04/15/19 04/16/19 04/16/19





  20:30 01:00 05:00


 


WBC   


 


RBC   


 


Hgb   


 


Hct   


 


MCV   


 


MCH   


 


MCHC   


 


RDW   


 


Plt Count   


 


MPV   


 


Absolute Neuts (auto)   


 


Neutrophils %   


 


Lymphocytes %   


 


Monocytes %   


 


Eosinophils %   


 


Basophils %   


 


Nucleated RBC %   


 


ESR   


 


PT with INR   


 


INR   


 


D-Dimer   


 


Anticoagulation Therapy   


 


Puncture Site   


 


ABG pH   


 


ABG pCO2 at Pt Temp   


 


ABG pO2 at Pt Temp   


 


ABG HCO3   


 


ABG O2 Sat (Measured)   


 


ABG O2 Content   


 


ABG Base Excess   


 


Bobby Test   


 


Carboxyhemoglobin   


 


Methemoglobin   


 


O2 Delivery Device   


 


Oxygen Flow Rate   


 


Vent Mode   


 


Vent Rate   


 


Mechanical Rate   


 


Pressure Support Vent   


 


Sodium   


 


Potassium   


 


Chloride   


 


Carbon Dioxide   


 


Anion Gap   


 


BUN   


 


Creatinine   


 


Creat Clearance w eGFR   


 


Random Glucose   


 


Calcium   


 


Phosphorus   


 


Magnesium   


 


Total Bilirubin   


 


AST   


 


ALT   


 


Alkaline Phosphatase   


 


LD Total   


 


Creatine Kinase   


 


Troponin I   


 


C-Reactive Protein   


 


B-Natriuretic Peptide   


 


Total Protein   


 


Albumin   


 


Triglycerides   


 


Cholesterol   


 


Total LDL Cholesterol   


 


HDL Cholesterol   


 


TSH   


 


Free T4  0.42 L  


 


Serum Pregnancy, Qual   


 


Urine Color    Yellow


 


Urine Appearance    Clear


 


Urine pH    5.0


 


Ur Specific Gravity    1.023


 


Urine Protein    Negative


 


Urine Glucose (UA)    Negative


 


Urine Ketones    1+ H


 


Urine Blood    Negative


 


Urine Nitrite    Negative


 


Urine Bilirubin    Negative


 


Urine Urobilinogen    0.2


 


Ur Leukocyte Esterase    Negative


 


Urine Creatinine   


 


Opiates Screen   Negative 


 


Methadone Screen   Negative 


 


Barbiturate Screen   Negative 


 


Phencyclidine Screen   Negative 


 


Ur Amphetamines Screen   Negative 


 


MDMA (Ecstasy) Screen   Negative 


 


Benzodiazepines Screen   Negative 


 


Cocaine Screen   Negative 


 


U Marijuana (THC) Screen   Positive A* 


 


Blood Type   


 


Antibody Screen   














  04/16/19 04/16/19 04/16/19





  05:30 05:30 09:00


 


WBC   11.3 H 


 


RBC   3.87 


 


Hgb   12.0 


 


Hct   35.0 


 


MCV   90.3 


 


MCH   30.9 


 


MCHC   34.2 


 


RDW   13.8 


 


Plt Count   220 


 


MPV   10.2 


 


Absolute Neuts (auto)   9.4 H 


 


Neutrophils %   83.4 H 


 


Lymphocytes %   12.9  D 


 


Monocytes %   1.9 L 


 


Eosinophils %   0.8 


 


Basophils %   1.0 


 


Nucleated RBC %   0 


 


ESR   8 


 


PT with INR   


 


INR   


 


D-Dimer   


 


Anticoagulation Therapy   


 


Puncture Site   


 


ABG pH   


 


ABG pCO2 at Pt Temp   


 


ABG pO2 at Pt Temp   


 


ABG HCO3   


 


ABG O2 Sat (Measured)   


 


ABG O2 Content   


 


ABG Base Excess   


 


Bobby Test   


 


Carboxyhemoglobin   


 


Methemoglobin   


 


O2 Delivery Device   


 


Oxygen Flow Rate   


 


Vent Mode   


 


Vent Rate   


 


Mechanical Rate   


 


Pressure Support Vent   


 


Sodium  139  


 


Potassium  3.6  


 


Chloride  108 H  


 


Carbon Dioxide  23  


 


Anion Gap  8  


 


BUN  13  


 


Creatinine  1.0  


 


Creat Clearance w eGFR  62.74  


 


Random Glucose  73 L  


 


Calcium  8.3 L  


 


Phosphorus  3.3  


 


Magnesium  2.0  


 


Total Bilirubin  0.7  


 


AST  15  


 


ALT  23  


 


Alkaline Phosphatase  53  


 


LD Total  185  


 


Creatine Kinase   


 


Troponin I   


 


C-Reactive Protein  < 0.3  


 


B-Natriuretic Peptide   


 


Total Protein  6.7  


 


Albumin  3.3 L  


 


Triglycerides  162 H  


 


Cholesterol  211 H  


 


Total LDL Cholesterol  114 H  


 


HDL Cholesterol  72 H  


 


TSH   


 


Free T4   


 


Serum Pregnancy, Qual   


 


Urine Color   


 


Urine Appearance   


 


Urine pH   


 


Ur Specific Gravity   


 


Urine Protein   


 


Urine Glucose (UA)   


 


Urine Ketones   


 


Urine Blood   


 


Urine Nitrite   


 


Urine Bilirubin   


 


Urine Urobilinogen   


 


Ur Leukocyte Esterase   


 


Urine Creatinine    149.0


 


Opiates Screen   


 


Methadone Screen   


 


Barbiturate Screen   


 


Phencyclidine Screen   


 


Ur Amphetamines Screen   


 


MDMA (Ecstasy) Screen   


 


Benzodiazepines Screen   


 


Cocaine Screen   


 


U Marijuana (THC) Screen   


 


Blood Type   


 


Antibody Screen   














  04/16/19





  09:00


 


WBC 


 


RBC 


 


Hgb 


 


Hct 


 


MCV 


 


MCH 


 


MCHC 


 


RDW 


 


Plt Count 


 


MPV 


 


Absolute Neuts (auto) 


 


Neutrophils % 


 


Lymphocytes % 


 


Monocytes % 


 


Eosinophils % 


 


Basophils % 


 


Nucleated RBC % 


 


ESR 


 


PT with INR 


 


INR 


 


D-Dimer 


 


Anticoagulation Therapy 


 


Puncture Site 


 


ABG pH 


 


ABG pCO2 at Pt Temp 


 


ABG pO2 at Pt Temp 


 


ABG HCO3 


 


ABG O2 Sat (Measured) 


 


ABG O2 Content 


 


ABG Base Excess 


 


Bobby Test 


 


Carboxyhemoglobin 


 


Methemoglobin 


 


O2 Delivery Device 


 


Oxygen Flow Rate 


 


Vent Mode 


 


Vent Rate 


 


Mechanical Rate 


 


Pressure Support Vent 


 


Sodium 


 


Potassium 


 


Chloride 


 


Carbon Dioxide 


 


Anion Gap 


 


BUN 


 


Creatinine 


 


Creat Clearance w eGFR 


 


Random Glucose 


 


Calcium 


 


Phosphorus 


 


Magnesium 


 


Total Bilirubin 


 


AST 


 


ALT 


 


Alkaline Phosphatase 


 


LD Total 


 


Creatine Kinase 


 


Troponin I 


 


C-Reactive Protein 


 


B-Natriuretic Peptide 


 


Total Protein 


 


Albumin 


 


Triglycerides 


 


Cholesterol 


 


Total LDL Cholesterol 


 


HDL Cholesterol 


 


TSH 


 


Free T4 


 


Serum Pregnancy, Qual 


 


Urine Color 


 


Urine Appearance 


 


Urine pH 


 


Ur Specific Gravity 


 


Urine Protein  22 H


 


Urine Glucose (UA) 


 


Urine Ketones 


 


Urine Blood 


 


Urine Nitrite 


 


Urine Bilirubin 


 


Urine Urobilinogen 


 


Ur Leukocyte Esterase 


 


Urine Creatinine 


 


Opiates Screen 


 


Methadone Screen 


 


Barbiturate Screen 


 


Phencyclidine Screen 


 


Ur Amphetamines Screen 


 


MDMA (Ecstasy) Screen 


 


Benzodiazepines Screen 


 


Cocaine Screen 


 


U Marijuana (THC) Screen 


 


Blood Type 


 


Antibody Screen 








Active Medications











Generic Name Dose Route Start Last Admin





  Trade Name Freq  PRN Reason Stop Dose Admin


 


Acetaminophen  650 mg  04/16/19 00:35  04/16/19 10:09





  Tylenol -  PO   650 mg





  Q4H PRN   Administration





  HEADACHE   





     





     





     


 


Aspirin  81 mg  04/16/19 10:00  04/16/19 10:11





  Ecotrin -  PO   81 mg





  DAILY MP   Administration





     





     





     





     


 


Carvedilol  6.25 mg  04/16/19 10:00  04/16/19 10:11





  Coreg -  PO   6.25 mg





  BID MP   Administration





     





     





     





     


 


Chlorhexidine Gluconate  1 applic  04/15/19 23:45  04/16/19 00:37





  Hibiclens For Decolonization -  TP   1 applic





  HS MP   Administration





     





     





     





     


 


Furosemide  40 mg  04/17/19 10:00  





  Lasix Injection -  IVPUSH   





  DAILY [facility]   





     





     





     





     


 


Heparin Sodium (Porcine)  5,000 unit  04/15/19 22:00  04/16/19 12:59





  Heparin -  SQ   5,000 unit





  TID MP   Administration





     





     





     





     


 


Levothyroxine Sodium  75 mcg  04/16/19 12:00  04/16/19 12:38





  Synthroid -  PO   75 mcg





  AM MP   Administration





     





     





     





     


 


Mupirocin  1 applic  04/15/19 23:45  04/16/19 10:10





  Bactroban Ointment (For Decolonization) -  NS  04/20/19 23:44  1 applic





  BID MP   Administration





     





     





     





     


 


Valsartan  40 mg  04/16/19 10:00  04/16/19 10:09





  Diovan -  PO   40 mg





  DAILY MP   Administration





     





     





     





     











ASSESSMENT/PLAN:


Patient is a 38 year old female with a significant past medical history of 

hypothyroidism.  She presents to the ED on 4/15/19 with c/o of worsening 

exertional shortness of breath, palpitations and chest pressure x 2 weeks.  She 

went to see her PCP and was directed to the ED.   





Imaging:


Chest CTA:  no PE seen but found: 1. interstitial pulmonary vascular congestion

, mild cardiomegaly, small bilateral pleural effusions.  2. left upper and left 

lower lobe opacity seen which may be on the basis of infiltrate and/or edema.  


Chest xray 4/16/19: new fluid and atelectasis at the right base.some course 

lung changes and prominent mediastinum


Echo: moderately reduced LV systolic fx with LVEF 35% LV mild dilated.  

diastolic dysfx. tethered MV leafelts with mod regurg.  small pericardial 

effusion.





Cardio/Pulmonary


Acute shortness of breath in the setting of new onset CHF, hypertension 


On Lasix 40mg daily


ASA 81mg


Monitor intake and output


Monitor electrolytes


cardiology following





Hypertension


On Diovan 40mg daily





Hyperlipidemia


Elevated lipid panel


Started on lipitor 40mg daily





Pulmonary:


Acute respiratory distress


Pulmonary edema


On IV lasix daily


Follow mag, electrolytes


Cardio/pulmonary following


On supplemental oxygen





Endocrine:


Hypothyroidism


Elevated TSH 29


Synthroid 75 mcgs daily





Renal:


Chronc kidney disease





fen


monitor electrolytes, daily weights


low salt diet





prophy:


SCDs











Visit type





- Emergency Visit


Emergency Visit: Yes


ED Registration Date: 04/15/19


Care time: The patient presented to the Emergency Department on the above date 

and was hospitalized for further evaluation of their emergent condition.





- New Patient


This patient is new to me today: Yes


Date on this admission: 04/16/19





- Critical Care


Critical Care patient: No





- Discharge Referral


Referred to [facility] No

## 2019-04-17 VITALS — DIASTOLIC BLOOD PRESSURE: 67 MMHG | SYSTOLIC BLOOD PRESSURE: 126 MMHG | TEMPERATURE: 98 F

## 2019-04-17 VITALS — HEART RATE: 82 BPM

## 2019-04-17 LAB
ALBUMIN SERPL-MCNC: 3.1 G/DL (ref 3.4–5)
ALP SERPL-CCNC: 50 U/L (ref 45–117)
ALT SERPL-CCNC: 21 U/L (ref 13–61)
ANION GAP SERPL CALC-SCNC: 6 MMOL/L (ref 8–16)
AST SERPL-CCNC: 14 U/L (ref 15–37)
BILIRUB SERPL-MCNC: 0.6 MG/DL (ref 0.2–1)
BUN SERPL-MCNC: 13 MG/DL (ref 7–18)
CALCIUM SERPL-MCNC: 8.6 MG/DL (ref 8.5–10.1)
CHLORIDE SERPL-SCNC: 107 MMOL/L (ref 98–107)
CO2 SERPL-SCNC: 27 MMOL/L (ref 21–32)
CREAT SERPL-MCNC: 1.2 MG/DL (ref 0.55–1.3)
DEPRECATED RDW RBC AUTO: 13.5 % (ref 11.6–15.6)
GLUCOSE SERPL-MCNC: 74 MG/DL (ref 74–106)
HCT VFR BLD CALC: 36.1 % (ref 32.4–45.2)
HGB BLD-MCNC: 12.2 GM/DL (ref 10.7–15.3)
MAGNESIUM SERPL-MCNC: 2.1 MG/DL (ref 1.8–2.4)
MCH RBC QN AUTO: 30.8 PG (ref 25.7–33.7)
MCHC RBC AUTO-ENTMCNC: 33.8 G/DL (ref 32–36)
MCV RBC: 91.3 FL (ref 80–96)
PHOSPHATE SERPL-MCNC: 4.2 MG/DL (ref 2.5–4.9)
PLATELET # BLD AUTO: 225 K/MM3 (ref 134–434)
PMV BLD: 9.7 FL (ref 7.5–11.1)
POTASSIUM SERPLBLD-SCNC: 4 MMOL/L (ref 3.5–5.1)
PROT SERPL-MCNC: 6.4 G/DL (ref 6.4–8.2)
RBC # BLD AUTO: 3.95 M/MM3 (ref 3.6–5.2)
SODIUM SERPL-SCNC: 140 MMOL/L (ref 136–145)
WBC # BLD AUTO: 7.4 K/MM3 (ref 4–10)

## 2019-04-17 RX ADMIN — CARVEDILOL SCH MG: 6.25 TABLET, FILM COATED ORAL at 10:20

## 2019-04-17 RX ADMIN — ACETAMINOPHEN PRN MG: 325 TABLET ORAL at 10:25

## 2019-04-17 RX ADMIN — HEPARIN SODIUM SCH UNIT: 5000 INJECTION, SOLUTION INTRAVENOUS; SUBCUTANEOUS at 06:31

## 2019-04-17 RX ADMIN — HEPARIN SODIUM SCH: 5000 INJECTION, SOLUTION INTRAVENOUS; SUBCUTANEOUS at 14:03

## 2019-04-17 NOTE — PN
Progress Note, Physician


History of Present Illness: 





PULMONARY





ALERT,FEELING BETTER,-SOB





- Current Medication List


Current Medications: 


Active Medications





Acetaminophen (Tylenol -)  650 mg PO Q4H PRN


   PRN Reason: HEADACHE


   Last Admin: 04/17/19 10:25 Dose:  650 mg


Aspirin (Ecotrin -)  81 mg PO DAILY [facility]


   Last Admin: 04/17/19 10:20 Dose:  81 mg


Atorvastatin Calcium (Lipitor -)  40 mg PO HS [facility]


   Last Admin: 04/16/19 21:10 Dose:  40 mg


Carvedilol (Coreg -)  6.25 mg PO BID [facility]


   Last Admin: 04/17/19 10:20 Dose:  6.25 mg


Furosemide (Lasix Injection -)  40 mg IVPUSH DAILY [facility]


   Last Admin: 04/17/19 10:20 Dose:  40 mg


Heparin Sodium (Porcine) (Heparin -)  5,000 unit SQ TID [facility]


   Last Admin: 04/17/19 06:31 Dose:  5,000 unit


Levothyroxine Sodium (Synthroid -)  75 mcg PO AM [facility]


   Last Admin: 04/17/19 06:34 Dose:  75 mcg


Valsartan (Diovan -)  40 mg PO DAILY [facility]


   Last Admin: 04/17/19 10:20 Dose:  40 mg











- Objective


Vital Signs: 


 Vital Signs











Temperature  98.2 F   04/17/19 06:00


 


Pulse Rate  66   04/17/19 06:00


 


Respiratory Rate  18   04/17/19 06:00


 


Blood Pressure  127/78   04/17/19 06:00


 


O2 Sat by Pulse Oximetry (%)  100   04/17/19 05:05











Constitutional: Yes: Well Nourished, Calm


Eyes: Yes: WNL


HENT: Yes: WNL


Neck: Yes: WNL


Cardiovascular: Yes: Regular Rate and Rhythm, S1, S2


Respiratory: Yes: CTA Bilaterally


Gastrointestinal: Yes: Normal Bowel Sounds, Soft


Extremities: Yes: WNL


Edema: No


Labs: 


 CBC, BMP





 04/17/19 05:30 





 04/17/19 05:30 





 INR, PTT











INR  1.05  (0.83-1.09)   04/15/19  13:00    














Problem List





- Problems


(1) Heart failure


Code(s): I50.9 - HEART FAILURE, UNSPECIFIED   


Qualifiers: 


   Heart failure type: unspecified   Heart failure chronicity: acute   

Qualified Code(s): I50.9 - Heart failure, unspecified   





(2) Pulmonary edema


Code(s): J81.1 - CHRONIC PULMONARY EDEMA   


Qualifiers: 


   Chronicity: acute   Qualified Code(s): J81.0 - Acute pulmonary edema   





(3) HTN (hypertension)


Code(s): I10 - ESSENTIAL (PRIMARY) HYPERTENSION   





Assessment/Plan





ASSESSMENT AND PLAN:


Acute Congestive Heart Failure


HTN


Hypothyroidism


Hypercholesterolemia





-  lasix


-  monitor urine output, creatinine


-  daily weights


-  beta blocker, ACE-I


-  synthroid


-  O2 to keep SpO2 >90%


-  DVT prophylaxis





DR MARTINZE

## 2019-04-17 NOTE — DS
Physical Exam: 


SUBJECTIVE: Patient seen and examined at the bedside.   denies shortness of 

breath or chest pain.





ambulated with respiratory therapist and maintained stable oxygen on room air.  

does not qualify for home oxygen.





OBJECTIVE:


seen by cardiology and cleared for discharge home.


initially cardiologist ordered Entresto, however it is not covered by her ins 

and will cost $500 per [facility] (I spoke to pharmacist Jorge Luis).


Called Dr. Cruz and made him aware that will order Diovan 40mg instead and he 

is in agreement.  


Patient to call for an appt to be seen by cardiology group in 1-2 weeks.


 Vital Signs











 Period  Temp  Pulse  Resp  BP Sys/Morales  Pulse Ox


 


 Last 24 Hr  97.7 F-98.6 F  46-89  16-21  124-142/  








PHYSICAL EXAM





GENERAL: The patient is awake, alert, and fully oriented, in no acute distress.


HEAD: Normal with no signs of trauma.


EYES: PERRL, extraocular movements intact, sclera anicteric, conjunctiva clear. 

No ptosis. 


ENT: Ears normal, nares patent, oropharynx clear without exudates, moist mucous 

membranes.


NECK: Trachea midline, full range of motion, supple. 


LUNGS: Breath sounds equal, clear to auscultation bilaterally, no wheezes


HEART: Regular rate and rhythm, S1, S2 without murmur, rub or gallop.


ABDOMEN: Soft, nontender, nondistended, normoactive bowel sounds, no guarding, 

no 


rebound, no hepatosplenomegaly, no masses.


EXTREMITIES: 2+ pulses, warm, well-perfused, no edema. 


NEUROLOGICAL: Cranial nerves II through XII grossly intact. Normal speech, gait 

not observed.


PSYCH: Normal mood, normal affect.


SKIN: Warm, dry, normal turgor, no rashes or lesions noted





LABS


 Laboratory Results - last 24 hr











  04/15/19 04/16/19 04/17/19





  20:30 15:55 05:30


 


WBC    7.4


 


RBC    3.95


 


Hgb    12.2


 


Hct    36.1


 


MCV    91.3


 


MCH    30.8


 


MCHC    33.8


 


RDW    13.5


 


Plt Count    225


 


MPV    9.7


 


Sodium   


 


Potassium   


 


Chloride   


 


Carbon Dioxide   


 


Anion Gap   


 


BUN   


 


Creatinine   


 


Creat Clearance w eGFR   


 


Random Glucose   


 


Calcium   


 


Phosphorus   


 


Magnesium   


 


Total Bilirubin   


 


AST   


 


ALT   


 


Alkaline Phosphatase   


 


Total Protein   


 


Albumin   


 


Free T3  1.0 L  


 


Ur Storden Metanephrines   Cancelled 


 


Ur Ran Vanillylmandelic   Cancelled 














  04/17/19





  05:30


 


WBC 


 


RBC 


 


Hgb 


 


Hct 


 


MCV 


 


MCH 


 


MCHC 


 


RDW 


 


Plt Count 


 


MPV 


 


Sodium  140


 


Potassium  4.0


 


Chloride  107


 


Carbon Dioxide  27


 


Anion Gap  6 L


 


BUN  13


 


Creatinine  1.2


 


Creat Clearance w eGFR  50.83


 


Random Glucose  74


 


Calcium  8.6


 


Phosphorus  4.2


 


Magnesium  2.1


 


Total Bilirubin  0.6


 


AST  14 L


 


ALT  21


 


Alkaline Phosphatase  50


 


Total Protein  6.4


 


Albumin  3.1 L


 


Free T3 


 


Ur Storden Metanephrines 


 


Ur Ran Vanillylmandelic 











HOSPITAL COURSE:





Date of Admission:04/15/19





Date of Discharge: 04/17/19





PRE HOSPITAL COURSE ADMISSION NOTE:


This is a 37 yo F, with PMH of hypothyroidism, who present due to worsening 

exertional SOB, palpitations and chest pressure x 2 w. patient was directed to 

ED by her PCP after a visit on 4/08 for DVT/PE/effusion work-up due to noted 

tacycardia and diminished breath sounds in R lung base. Patient has not taken 

her levothyroxine for several months due to loss of insurance and reports new 

bouts on anxiety and heat intolerance. Patient does not take any medication but 

did use nuvaring birth control until jan 2019. Denies family history of CHF, 

sudden death. denies recent viral illness, drug use, alcohol abuse, syncope, 

cough, orthopnea, le edema, f/c, abd pain,n/v/d/c, dysuria. 





CTA no PE but shows diffuse pulm edema suspicious for ARDS. Placed on BIPAP in 

ED





Imaging:


Chest CTA:  no PE seen but found: 1. interstitial pulmonary vascular congestion

, mild cardiomegaly, small bilateral pleural effusions.  2. left upper and left 

lower lobe opacity seen which may be on the basis of infiltrate and/or edema.  


Chest xray 4/16/19: new fluid and atelectasis at the right base.some course 

lung changes and prominent mediastinum


Echo: moderately reduced LV systolic fx with LVEF 35% LV mild dilated.  

diastolic dysfx. tethered MV leafelts with mod regurg.  small pericardial 

effusion.





HOSPITAL COURSE BY PROBLEM LIST:


Cardio/Pulmonary


Hypertension


Acute shortness of breath in the setting of new onset CHF, hypertension. 

RESOLVED.


Initially on Lasix 40mg daily, will discharge on sprinadalone per cardiology.  

Initially prescribed Entresto, but not supported by her insurance as out of  

pocket cost were high.  Discussed with cardiologist and will resume patient 

back on Diovan 40mg daily.  Spoke to patient after discharge and informed her 

of the change of medications.  Continue ASA 81mg daily.  Coreq 6.25mg BID.





Hyperlipidemia


Elevated lipid panel


Started on lipitor 40mg daily





Acute respiratory distress, RESOLVED


Pulmonary edema. on sprinadatalone therapy.


Patient to follow up with Dr Cruz outpatient.  Does not qualify for home oxygen 

therapy.





Endocrine:


Hypothyroidism


Elevated TSH 29


Synthroid 75 mcgs daily, new prescription called into her pharmacy.  She has an 

endocrinologist (Dr. Banerjee) she follows with and has agreed to do so for 

follow up TSH.





Renal:


Chronc kidney disease. outpatient follow up per primary. 





full code








Minutes to complete discharge: 60





Discharge Summary


Reason For Visit: HEART FAILURE/PLEURAL EFFUSION


Current Active Problems





HTN (hypertension) (Acute)


Heart failure (Acute)


Hyperlipidemia (Acute)


Hypertensive cardiomyopathy (Acute)


Hypothyroid (Acute)


Pulmonary edema (Acute)


Pulmonary edema cardiac cause (Acute)








Condition: Guarded





- Instructions


Diet, Activity, Other Instructions: 


Ms Gagnon:





You were admitted to [facility] for acute shortness of breath.

  During workup an echocardiogram showed systolic/diastolic heart dysfunction 

related to untreated hypothyroidism and you were started on a diuretics and 

cardiac medications for hypertension.  





New medication list:


Entresto 24/26 twice per day


Lipitor 40mg for high cholesterol


Synthroid 75 mcgs for hypothyroidism


Aspirin 81mg daily





--------------------------------





Since you are starting these medications, it is important that you have your 

labs repeated with your primary care doctor to assure that your blood work 

remains normal and your kidney function is not compromised. 





Your TSH (thyroid stimulating hormone) is elevated and your synthroid 75mcg 

dose has been restarted.  





Please follow up with cardiologist as an outpatient for by calling for an 

appointment.  f/u in office [phone]





Thank you for allowing us to care for you.  





Rhonda Kumar Maher NP


[phone]


[facility] @ [facility]











Referrals: 


Chyna Abraham MD [Primary Care Provider] - 1 Week


Alphonse Cruz MD [Staff Physician] - 


Disposition: HOME





- Home Medications


Comprehensive Discharge Medication List: 


Ambulatory Orders





Levothyroxine [Synthroid -] 75 mcg PO DAILY 04/16/19 


Aspirin Coated [Ecotrin -] 81 mg PO DAILY #60 tablet.ec 04/17/19 


Atorvastatin Ca [Lipitor] 40 mg PO HS #30 tablet 04/17/19 


Carvedilol [Coreg -] 6.25 mg PO BID #60 tablet 04/17/19 


Sacubitril/Valsartan [Entresto 24 mg-26 mg Tablet] 1 each PO BID #60 tablet 04/ 17/19 


Sacubitril/Valsartan [Entresto 24 mg-26 mg Tablet] 1 tab PO BID #30 tablet 04/17 /19 


Spironolactone [Aldactone -] 25 mg PO DAILY #30 tablet 04/17/19 








This patient is new to me today: No


Emergency Visit: Yes


ED Registration Date: 04/15/19


Care time: The patient presented to the Emergency Department on the above date 

and was hospitalized for further evaluation of their emergent condition.


Critical Care patient: No





- Discharge Referral


Referred to [facility] No

## 2019-04-17 NOTE — PN
Progress Note, Physician


History of Present Illness: 


Dyspnea on exertion and orthopnea resolving with diuresis. Had ran out of 

Synthroid 2 weeks prior due to loss of insurance coverage.








- Current Medication List


Current Medications: 


Active Medications





Acetaminophen (Tylenol -)  650 mg PO Q4H PRN


   PRN Reason: HEADACHE


   Last Admin: 04/17/19 10:25 Dose:  650 mg


Aspirin (Ecotrin -)  81 mg PO DAILY [facility]


   Last Admin: 04/17/19 10:20 Dose:  81 mg


Atorvastatin Calcium (Lipitor -)  40 mg PO HS [facility]


   Last Admin: 04/16/19 21:10 Dose:  40 mg


Carvedilol (Coreg -)  6.25 mg PO BID [facility]


   Last Admin: 04/17/19 10:20 Dose:  6.25 mg


Furosemide (Lasix Injection -)  40 mg IVPUSH DAILY [facility]


   Last Admin: 04/17/19 10:20 Dose:  40 mg


Heparin Sodium (Porcine) (Heparin -)  5,000 unit SQ TID [facility]


   Last Admin: 04/17/19 06:31 Dose:  5,000 unit


Levothyroxine Sodium (Synthroid -)  75 mcg PO AM [facility]


   Last Admin: 04/17/19 06:34 Dose:  75 mcg


Valsartan (Diovan -)  40 mg PO DAILY [facility]


   Last Admin: 04/17/19 10:20 Dose:  40 mg











- Objective


Vital Signs: 


 Vital Signs











Temperature  97.8 F   04/17/19 10:00


 


Pulse Rate  64   04/17/19 10:00


 


Respiratory Rate  18   04/17/19 10:00


 


Blood Pressure  124/76   04/17/19 10:00


 


O2 Sat by Pulse Oximetry (%)  100   04/17/19 05:05











Constitutional: Yes: No Distress, Calm, Thin


Neck: Yes: Supple


Cardiovascular: Yes: Regular Rate and Rhythm


Respiratory: Yes: Regular, CTA Bilaterally, On Nasal O2


Gastrointestinal: Yes: Normal Bowel Sounds, Soft


Edema: No


Labs: 


 CBC, BMP





 04/17/19 05:30 





 04/17/19 05:30 





 INR, PTT











INR  1.05  (0.83-1.09)   04/15/19  13:00    














- ....Imaging


Chest X-ray: Report Reviewed (Rt ATX and effusion)





Problem List





- Problems


(1) Hyperlipidemia


Code(s): E78.5 - HYPERLIPIDEMIA, UNSPECIFIED   


Qualifiers: 


   Hyperlipidemia type: pure hypercholesterolemia   Qualified Code(s): E78.00 - 

Pure hypercholesterolemia, unspecified; E78.0 - Pure hypercholesterolemia   





(2) Hypertensive cardiomyopathy


Code(s): I11.9 - HYPERTENSIVE HEART DISEASE WITHOUT HEART FAILURE; I43 - 

CARDIOMYOPATHY IN DISEASES CLASSIFIED ELSEWHERE   


Qualifiers: 


   Heart failure presence: with heart failure   Qualified Code(s): I11.0 - 

Hypertensive heart disease with heart failure; I43 - Cardiomyopathy in diseases 

classified elsewhere   





(3) Heart failure


Code(s): I50.9 - HEART FAILURE, UNSPECIFIED   


Qualifiers: 


   Heart failure type: combined systolic and diastolic   Heart failure 

chronicity: acute   Qualified Code(s): I50.41 - Acute combined systolic (

congestive) and diastolic (congestive) heart failure   





(4) Hypothyroid


Code(s): E03.9 - HYPOTHYROIDISM, UNSPECIFIED   


Qualifiers: 


   Hypothyroidism type: unspecified   Qualified Code(s): E03.9 - Hypothyroidism

, unspecified   





(5) Pulmonary edema cardiac cause


Code(s): I50.1 - LEFT VENTRICULAR FAILURE, UNSPECIFIED   





Assessment/Plan


04/16/2019 Echo: Severe posterior HK, mild dilated LV with mod decreased LVEF 35

%, grade 3 diastolic dysfunction with markedly elevated LA pressure, mod MR, 

small pericardial effusion and pleural effusion





1. Acute LV diastolic/systolic heart failure referable to untreated 

hypothyroidism resolving


2. Hypertensive cardiomyopathy


3. Hypercholesterolemia


4. Hypothyroidism, untreated - noncompliance


5. Chronic kidney disease





PLAN:





1. Oral diuresis Aldactone 25 qd with monitor diuretic response, renal fxn and 

electrolytes


2. Continue Coreg 6.25 bid, hemodynamics permitting


3. Change Diovan to Entresto 24/26 bid, Lipitor 40 qhs, d/c ASA 81 qd


4. Synthroid initiation for above noted hypothyroidism and dose per TSH


5. OCPs and counselled against pregnancy while on ARB, emphasized importance of 

diet and medication compliance


6. May d/c with f/u in office [phone]

## 2023-06-14 ENCOUNTER — HOSPITAL ENCOUNTER (OUTPATIENT)
Dept: HOSPITAL 74 - JRADUS-SUR | Age: 43
Discharge: HOME | End: 2023-06-14
Attending: MIDWIFE
Payer: COMMERCIAL

## 2023-06-14 DIAGNOSIS — D24.2: Primary | ICD-10-CM

## 2023-06-14 DIAGNOSIS — D24.1: ICD-10-CM

## 2023-06-14 PROCEDURE — 0H9V3ZX DRAINAGE OF BILATERAL BREAST, PERCUTANEOUS APPROACH, DIAGNOSTIC: ICD-10-PCS

## 2023-06-14 PROCEDURE — A4648 IMPLANTABLE TISSUE MARKER: HCPCS
